# Patient Record
Sex: MALE | Race: WHITE | NOT HISPANIC OR LATINO | Employment: FULL TIME | URBAN - METROPOLITAN AREA
[De-identification: names, ages, dates, MRNs, and addresses within clinical notes are randomized per-mention and may not be internally consistent; named-entity substitution may affect disease eponyms.]

---

## 2022-09-04 ENCOUNTER — HOSPITAL ENCOUNTER (OUTPATIENT)
Facility: MEDICAL CENTER | Age: 57
End: 2022-09-05
Attending: EMERGENCY MEDICINE | Admitting: HOSPITALIST
Payer: COMMERCIAL

## 2022-09-04 ENCOUNTER — APPOINTMENT (OUTPATIENT)
Dept: RADIOLOGY | Facility: MEDICAL CENTER | Age: 57
End: 2022-09-04
Attending: EMERGENCY MEDICINE
Payer: COMMERCIAL

## 2022-09-04 DIAGNOSIS — I48.0 PAROXYSMAL ATRIAL FIBRILLATION (HCC): ICD-10-CM

## 2022-09-04 PROBLEM — R07.9 CHEST PAIN: Status: ACTIVE | Noted: 2022-09-04

## 2022-09-04 PROBLEM — I10 ESSENTIAL HYPERTENSION: Status: ACTIVE | Noted: 2022-09-04

## 2022-09-04 PROBLEM — R79.89 ELEVATED D-DIMER: Status: ACTIVE | Noted: 2022-09-04

## 2022-09-04 PROBLEM — I25.10 CAD (CORONARY ARTERY DISEASE): Status: ACTIVE | Noted: 2022-09-04

## 2022-09-04 PROBLEM — N18.30 STAGE 3 CHRONIC KIDNEY DISEASE: Status: ACTIVE | Noted: 2022-09-04

## 2022-09-04 PROBLEM — I13.0 BENIGN HYPERTENSIVE HEART AND KIDNEY DISEASE WITH DIASTOLIC CHF, NYHA CLASS II AND CKD STAGE III (HCC): Status: ACTIVE | Noted: 2022-09-04

## 2022-09-04 PROBLEM — I50.30 BENIGN HYPERTENSIVE HEART AND KIDNEY DISEASE WITH DIASTOLIC CHF, NYHA CLASS II AND CKD STAGE III (HCC): Status: ACTIVE | Noted: 2022-09-04

## 2022-09-04 PROBLEM — E11.65 TYPE 2 DIABETES MELLITUS WITH HYPERGLYCEMIA, WITHOUT LONG-TERM CURRENT USE OF INSULIN (HCC): Status: ACTIVE | Noted: 2022-09-04

## 2022-09-04 PROBLEM — N18.30 BENIGN HYPERTENSIVE HEART AND KIDNEY DISEASE WITH DIASTOLIC CHF, NYHA CLASS II AND CKD STAGE III (HCC): Status: ACTIVE | Noted: 2022-09-04

## 2022-09-04 LAB
ALBUMIN SERPL BCP-MCNC: 3.4 G/DL (ref 3.2–4.9)
ALBUMIN/GLOB SERPL: 1.6 G/DL
ALP SERPL-CCNC: 56 U/L (ref 30–99)
ALT SERPL-CCNC: 44 U/L (ref 2–50)
ANION GAP SERPL CALC-SCNC: 7 MMOL/L (ref 7–16)
APTT PPP: 30.1 SEC (ref 24.7–36)
AST SERPL-CCNC: 30 U/L (ref 12–45)
BASOPHILS # BLD AUTO: 0.4 % (ref 0–1.8)
BASOPHILS # BLD: 0.03 K/UL (ref 0–0.12)
BILIRUB SERPL-MCNC: 0.5 MG/DL (ref 0.1–1.5)
BUN SERPL-MCNC: 31 MG/DL (ref 8–22)
CALCIUM SERPL-MCNC: 8.1 MG/DL (ref 8.5–10.5)
CHLORIDE SERPL-SCNC: 109 MMOL/L (ref 96–112)
CO2 SERPL-SCNC: 25 MMOL/L (ref 20–33)
CREAT SERPL-MCNC: 2 MG/DL (ref 0.5–1.4)
D DIMER PPP IA.FEU-MCNC: 4.02 UG/ML (FEU) (ref 0–0.5)
EKG IMPRESSION: NORMAL
EOSINOPHIL # BLD AUTO: 0.19 K/UL (ref 0–0.51)
EOSINOPHIL NFR BLD: 2.7 % (ref 0–6.9)
ERYTHROCYTE [DISTWIDTH] IN BLOOD BY AUTOMATED COUNT: 40 FL (ref 35.9–50)
GFR SERPLBLD CREATININE-BSD FMLA CKD-EPI: 38 ML/MIN/1.73 M 2
GLOBULIN SER CALC-MCNC: 2.1 G/DL (ref 1.9–3.5)
GLUCOSE BLD STRIP.AUTO-MCNC: 223 MG/DL (ref 65–99)
GLUCOSE SERPL-MCNC: 122 MG/DL (ref 65–99)
HCT VFR BLD AUTO: 35.5 % (ref 42–52)
HGB BLD-MCNC: 11.8 G/DL (ref 14–18)
IMM GRANULOCYTES # BLD AUTO: 0.02 K/UL (ref 0–0.11)
IMM GRANULOCYTES NFR BLD AUTO: 0.3 % (ref 0–0.9)
INR PPP: 1.1 (ref 0.87–1.13)
LIPASE SERPL-CCNC: 46 U/L (ref 11–82)
LYMPHOCYTES # BLD AUTO: 1.93 K/UL (ref 1–4.8)
LYMPHOCYTES NFR BLD: 27.1 % (ref 22–41)
MCH RBC QN AUTO: 28.4 PG (ref 27–33)
MCHC RBC AUTO-ENTMCNC: 33.2 G/DL (ref 33.7–35.3)
MCV RBC AUTO: 85.3 FL (ref 81.4–97.8)
MONOCYTES # BLD AUTO: 0.85 K/UL (ref 0–0.85)
MONOCYTES NFR BLD AUTO: 12 % (ref 0–13.4)
NEUTROPHILS # BLD AUTO: 4.09 K/UL (ref 1.82–7.42)
NEUTROPHILS NFR BLD: 57.5 % (ref 44–72)
NRBC # BLD AUTO: 0 K/UL
NRBC BLD-RTO: 0 /100 WBC
PLATELET # BLD AUTO: 206 K/UL (ref 164–446)
PMV BLD AUTO: 10.6 FL (ref 9–12.9)
POTASSIUM SERPL-SCNC: 3.9 MMOL/L (ref 3.6–5.5)
PROT SERPL-MCNC: 5.5 G/DL (ref 6–8.2)
PROTHROMBIN TIME: 14.1 SEC (ref 12–14.6)
RBC # BLD AUTO: 4.16 M/UL (ref 4.7–6.1)
SODIUM SERPL-SCNC: 141 MMOL/L (ref 135–145)
TROPONIN T SERPL-MCNC: 15 NG/L (ref 6–19)
TROPONIN T SERPL-MCNC: 16 NG/L (ref 6–19)
UFH PPP CHRO-ACNC: <0.1 IU/ML
WBC # BLD AUTO: 7.1 K/UL (ref 4.8–10.8)

## 2022-09-04 PROCEDURE — 85520 HEPARIN ASSAY: CPT

## 2022-09-04 PROCEDURE — 84484 ASSAY OF TROPONIN QUANT: CPT

## 2022-09-04 PROCEDURE — 80053 COMPREHEN METABOLIC PANEL: CPT

## 2022-09-04 PROCEDURE — 99285 EMERGENCY DEPT VISIT HI MDM: CPT

## 2022-09-04 PROCEDURE — A9270 NON-COVERED ITEM OR SERVICE: HCPCS | Performed by: HOSPITALIST

## 2022-09-04 PROCEDURE — 93005 ELECTROCARDIOGRAM TRACING: CPT | Performed by: EMERGENCY MEDICINE

## 2022-09-04 PROCEDURE — 700102 HCHG RX REV CODE 250 W/ 637 OVERRIDE(OP): Performed by: HOSPITALIST

## 2022-09-04 PROCEDURE — 99220 PR INITIAL OBSERVATION CARE,LEVL III: CPT | Performed by: HOSPITALIST

## 2022-09-04 PROCEDURE — 85730 THROMBOPLASTIN TIME PARTIAL: CPT

## 2022-09-04 PROCEDURE — 85379 FIBRIN DEGRADATION QUANT: CPT

## 2022-09-04 PROCEDURE — 85025 COMPLETE CBC W/AUTO DIFF WBC: CPT

## 2022-09-04 PROCEDURE — 36415 COLL VENOUS BLD VENIPUNCTURE: CPT

## 2022-09-04 PROCEDURE — 93005 ELECTROCARDIOGRAM TRACING: CPT | Performed by: HOSPITALIST

## 2022-09-04 PROCEDURE — G0378 HOSPITAL OBSERVATION PER HR: HCPCS

## 2022-09-04 PROCEDURE — 85610 PROTHROMBIN TIME: CPT

## 2022-09-04 PROCEDURE — 82962 GLUCOSE BLOOD TEST: CPT

## 2022-09-04 PROCEDURE — 700105 HCHG RX REV CODE 258: Performed by: EMERGENCY MEDICINE

## 2022-09-04 PROCEDURE — 93970 EXTREMITY STUDY: CPT | Mod: 26 | Performed by: INTERNAL MEDICINE

## 2022-09-04 PROCEDURE — 700105 HCHG RX REV CODE 258: Performed by: HOSPITALIST

## 2022-09-04 PROCEDURE — 96372 THER/PROPH/DIAG INJ SC/IM: CPT

## 2022-09-04 PROCEDURE — 93970 EXTREMITY STUDY: CPT

## 2022-09-04 PROCEDURE — 83690 ASSAY OF LIPASE: CPT

## 2022-09-04 PROCEDURE — 700111 HCHG RX REV CODE 636 W/ 250 OVERRIDE (IP): Performed by: HOSPITALIST

## 2022-09-04 PROCEDURE — 71045 X-RAY EXAM CHEST 1 VIEW: CPT

## 2022-09-04 RX ORDER — ASPIRIN 325 MG
325 TABLET ORAL DAILY
Status: DISCONTINUED | OUTPATIENT
Start: 2022-09-04 | End: 2022-09-05

## 2022-09-04 RX ORDER — ATORVASTATIN CALCIUM 80 MG/1
80 TABLET, FILM COATED ORAL NIGHTLY
COMMUNITY

## 2022-09-04 RX ORDER — POLYETHYLENE GLYCOL 3350 17 G/17G
1 POWDER, FOR SOLUTION ORAL
Status: DISCONTINUED | OUTPATIENT
Start: 2022-09-04 | End: 2022-09-05 | Stop reason: HOSPADM

## 2022-09-04 RX ORDER — PROMETHAZINE HYDROCHLORIDE 25 MG/1
12.5-25 TABLET ORAL EVERY 4 HOURS PRN
Status: DISCONTINUED | OUTPATIENT
Start: 2022-09-04 | End: 2022-09-05 | Stop reason: HOSPADM

## 2022-09-04 RX ORDER — GAUZE BANDAGE 2" X 2"
100 BANDAGE TOPICAL DAILY
COMMUNITY

## 2022-09-04 RX ORDER — ACETAMINOPHEN 325 MG/1
650 TABLET ORAL EVERY 6 HOURS PRN
Status: DISCONTINUED | OUTPATIENT
Start: 2022-09-04 | End: 2022-09-05 | Stop reason: HOSPADM

## 2022-09-04 RX ORDER — AMOXICILLIN 250 MG
2 CAPSULE ORAL 2 TIMES DAILY
Status: DISCONTINUED | OUTPATIENT
Start: 2022-09-05 | End: 2022-09-05 | Stop reason: HOSPADM

## 2022-09-04 RX ORDER — ONDANSETRON 2 MG/ML
4 INJECTION INTRAMUSCULAR; INTRAVENOUS EVERY 4 HOURS PRN
Status: DISCONTINUED | OUTPATIENT
Start: 2022-09-04 | End: 2022-09-05 | Stop reason: HOSPADM

## 2022-09-04 RX ORDER — ONDANSETRON 4 MG/1
4 TABLET, ORALLY DISINTEGRATING ORAL EVERY 4 HOURS PRN
Status: DISCONTINUED | OUTPATIENT
Start: 2022-09-04 | End: 2022-09-05 | Stop reason: HOSPADM

## 2022-09-04 RX ORDER — LABETALOL HYDROCHLORIDE 5 MG/ML
10 INJECTION, SOLUTION INTRAVENOUS EVERY 4 HOURS PRN
Status: DISCONTINUED | OUTPATIENT
Start: 2022-09-04 | End: 2022-09-05 | Stop reason: HOSPADM

## 2022-09-04 RX ORDER — SODIUM CHLORIDE 9 MG/ML
INJECTION, SOLUTION INTRAVENOUS CONTINUOUS
Status: DISCONTINUED | OUTPATIENT
Start: 2022-09-04 | End: 2022-09-05 | Stop reason: HOSPADM

## 2022-09-04 RX ORDER — GABAPENTIN 300 MG/1
900 CAPSULE ORAL
Status: DISCONTINUED | OUTPATIENT
Start: 2022-09-04 | End: 2022-09-05 | Stop reason: HOSPADM

## 2022-09-04 RX ORDER — GABAPENTIN 300 MG/1
900 CAPSULE ORAL
COMMUNITY

## 2022-09-04 RX ORDER — AMLODIPINE BESYLATE 10 MG/1
10 TABLET ORAL DAILY
Status: DISCONTINUED | OUTPATIENT
Start: 2022-09-05 | End: 2022-09-05 | Stop reason: HOSPADM

## 2022-09-04 RX ORDER — HEPARIN SODIUM 5000 [USP'U]/ML
5000 INJECTION, SOLUTION INTRAVENOUS; SUBCUTANEOUS EVERY 8 HOURS
Status: DISCONTINUED | OUTPATIENT
Start: 2022-09-04 | End: 2022-09-05

## 2022-09-04 RX ORDER — DULOXETIN HYDROCHLORIDE 60 MG/1
60 CAPSULE, DELAYED RELEASE ORAL DAILY
Status: DISCONTINUED | OUTPATIENT
Start: 2022-09-05 | End: 2022-09-05 | Stop reason: HOSPADM

## 2022-09-04 RX ORDER — FOLIC ACID 1 MG/1
1 TABLET ORAL DAILY
Status: DISCONTINUED | OUTPATIENT
Start: 2022-09-05 | End: 2022-09-05 | Stop reason: HOSPADM

## 2022-09-04 RX ORDER — ATORVASTATIN CALCIUM 80 MG/1
80 TABLET, FILM COATED ORAL NIGHTLY
Status: DISCONTINUED | OUTPATIENT
Start: 2022-09-04 | End: 2022-09-05 | Stop reason: HOSPADM

## 2022-09-04 RX ORDER — GAUZE BANDAGE 2" X 2"
100 BANDAGE TOPICAL DAILY
Status: DISCONTINUED | OUTPATIENT
Start: 2022-09-05 | End: 2022-09-05 | Stop reason: HOSPADM

## 2022-09-04 RX ORDER — SODIUM CHLORIDE 9 MG/ML
1000 INJECTION, SOLUTION INTRAVENOUS ONCE
Status: COMPLETED | OUTPATIENT
Start: 2022-09-04 | End: 2022-09-04

## 2022-09-04 RX ORDER — FOLIC ACID 1 MG/1
1 TABLET ORAL DAILY
COMMUNITY

## 2022-09-04 RX ORDER — CARVEDILOL 25 MG/1
25 TABLET ORAL 2 TIMES DAILY
COMMUNITY

## 2022-09-04 RX ORDER — PROMETHAZINE HYDROCHLORIDE 25 MG/1
12.5-25 SUPPOSITORY RECTAL EVERY 4 HOURS PRN
Status: DISCONTINUED | OUTPATIENT
Start: 2022-09-04 | End: 2022-09-05 | Stop reason: HOSPADM

## 2022-09-04 RX ORDER — DULOXETIN HYDROCHLORIDE 60 MG/1
60 CAPSULE, DELAYED RELEASE ORAL DAILY
COMMUNITY

## 2022-09-04 RX ORDER — ASPIRIN 81 MG/1
324 TABLET, CHEWABLE ORAL DAILY
Status: DISCONTINUED | OUTPATIENT
Start: 2022-09-04 | End: 2022-09-05

## 2022-09-04 RX ORDER — CARVEDILOL 12.5 MG/1
25 TABLET ORAL 2 TIMES DAILY
Status: DISCONTINUED | OUTPATIENT
Start: 2022-09-04 | End: 2022-09-05 | Stop reason: HOSPADM

## 2022-09-04 RX ORDER — AMLODIPINE BESYLATE 10 MG/1
10 TABLET ORAL DAILY
COMMUNITY

## 2022-09-04 RX ORDER — PROCHLORPERAZINE EDISYLATE 5 MG/ML
5-10 INJECTION INTRAMUSCULAR; INTRAVENOUS EVERY 4 HOURS PRN
Status: DISCONTINUED | OUTPATIENT
Start: 2022-09-04 | End: 2022-09-05 | Stop reason: HOSPADM

## 2022-09-04 RX ORDER — ASPIRIN 300 MG/1
300 SUPPOSITORY RECTAL DAILY
Status: DISCONTINUED | OUTPATIENT
Start: 2022-09-04 | End: 2022-09-05

## 2022-09-04 RX ORDER — BISACODYL 10 MG
10 SUPPOSITORY, RECTAL RECTAL
Status: DISCONTINUED | OUTPATIENT
Start: 2022-09-04 | End: 2022-09-05 | Stop reason: HOSPADM

## 2022-09-04 RX ADMIN — GABAPENTIN 900 MG: 300 CAPSULE ORAL at 21:44

## 2022-09-04 RX ADMIN — INSULIN HUMAN 3 UNITS: 100 INJECTION, SOLUTION PARENTERAL at 21:42

## 2022-09-04 RX ADMIN — ASPIRIN 325 MG: 325 TABLET ORAL at 18:36

## 2022-09-04 RX ADMIN — HEPARIN SODIUM 5000 UNITS: 5000 INJECTION, SOLUTION INTRAVENOUS; SUBCUTANEOUS at 21:43

## 2022-09-04 RX ADMIN — CARVEDILOL 25 MG: 12.5 TABLET, FILM COATED ORAL at 18:36

## 2022-09-04 RX ADMIN — ATORVASTATIN CALCIUM 80 MG: 80 TABLET, FILM COATED ORAL at 21:44

## 2022-09-04 RX ADMIN — SODIUM CHLORIDE: 9 INJECTION, SOLUTION INTRAVENOUS at 18:37

## 2022-09-04 RX ADMIN — SODIUM CHLORIDE 1000 ML: 9 INJECTION, SOLUTION INTRAVENOUS at 16:15

## 2022-09-04 ASSESSMENT — LIFESTYLE VARIABLES
EVER HAD A DRINK FIRST THING IN THE MORNING TO STEADY YOUR NERVES TO GET RID OF A HANGOVER: NO
EVER FELT BAD OR GUILTY ABOUT YOUR DRINKING: NO
DO YOU DRINK ALCOHOL: NO
CONSUMPTION TOTAL: INCOMPLETE
HAVE PEOPLE ANNOYED YOU BY CRITICIZING YOUR DRINKING: NO
TOTAL SCORE: 0
HAVE YOU EVER FELT YOU SHOULD CUT DOWN ON YOUR DRINKING: NO
TOTAL SCORE: 0
TOTAL SCORE: 0

## 2022-09-04 ASSESSMENT — FIBROSIS 4 INDEX: FIB4 SCORE: 1.27

## 2022-09-04 ASSESSMENT — ENCOUNTER SYMPTOMS: NAUSEA: 1

## 2022-09-04 NOTE — ED TRIAGE NOTES
Chief Complaint   Patient presents with    Headache     Pt presents from [facility] man for headache, CP and nausea that started at 0500am. Pt reports he forgot his nitro rx at home so was unable to take it when he started having CP. Pt given aspirin and nitro per EMS. Pt reports all symptoms have resolved upon arrival to ER.

## 2022-09-04 NOTE — ED PROVIDER NOTES
ED Provider Note    CHIEF COMPLAINT  Chief Complaint   Patient presents with    Headache     Pt presents from [facility] for headache, CP and nausea that started at 0500am. Pt reports he forgot his nitro rx at home so was unable to take it when he started having CP. Pt given aspirin and nitro per EMS. Pt reports all symptoms have resolved upon arrival to ER.       HPI  José Zabala is a 57 y.o. male who presents for evaluation of numerous symptoms including an episode of chest pain that started this morning.  He does have a parent history of prior heart attack.  He was out a burning man for the last several days.  He denies leg swelling or hemoptysis.  No pleuritic chest pain.  He did report some mild headache.  He denies any head injury or focal numbness weakness tingling to the arms legs or face.  No reported speech abnormalities.  No report of high fever or productive cough.  No leg pain or swelling.  He was seen at the [facility] and [facility] and had an EKG demonstrating no obvious ST segment elevation.  He was given nitroglycerin and aspirin.  He currently has no chest pain.  Patient apparently has history of kidney insufficiency and reports that he had a mild heart attack almost 10 years ago.  Patient is a marginal historian but he reports he never had a heart catheterization or bypass surgery.  He also reports that he had a nuclear medicine stress test last January which was normal.  He lives in [place].  The patient also has history of previous DVTs and is not currently on anticoagulant therapy    REVIEW OF SYSTEMS  See [facility] for further details.  No high fevers pleuritic chest pain numbness weakness tingling all other systems are negative.     PAST MEDICAL HISTORY  No past medical history on file.  Renal insufficiency apparent history of heart attack  History of DVT  FAMILY HISTORY  Noncontributory    SOCIAL HISTORY  Lives in [place]    SURGICAL HISTORY  No past surgical  "history on file.  No major surgeries  CURRENT MEDICATIONS  Home Medications    **Home medications have not yet been reviewed for this encounter**     Nitroglycerin, aspirin    ALLERGIES  No Known Allergies    PHYSICAL EXAM  VITAL SIGNS: BP (!) 162/90   Pulse (!) 59   Temp 37 °C (98.6 °F)   Resp 12   Ht 1.651 m (5' 5\")   Wt 93.9 kg (207 lb)   SpO2 91%   BMI 34.45 kg/m²       Constitutional: Well developed, Well nourished, No acute distress, Non-toxic appearance.   HENT: Normocephalic, Atraumatic, Bilateral external ears normal, Oropharynx moist, No oral exudates, Nose normal.   Eyes: PERRLA, EOMI, Conjunctiva normal, No discharge.   Neck: Normal range of motion, No tenderness, Supple, No stridor.   Cardiovascular: Normal heart rate, Normal rhythm, No murmurs, No rubs, No gallops.   Thorax & Lungs: Normal breath sounds, No respiratory distress, No wheezing, No chest tenderness.   Abdomen: Bowel sounds normal, Soft, No tenderness, No masses, No pulsatile masses.   Skin: Warm, Dry, No erythema, No rash.   Back: No tenderness, No CVA tenderness.   Extremities: Intact distal pulses, No edema, No tenderness, No cyanosis, No clubbing.   Neurologic: Alert & oriented x 3, Normal motor function, Normal sensory function, No focal deficits noted.   Psychiatric: Affect normal, Judgment normal, Mood normal.     Results for orders placed or performed during the hospital encounter of 09/04/22   CBC WITH DIFFERENTIAL   Result Value Ref Range    WBC 7.1 4.8 - 10.8 K/uL    RBC 4.16 (L) 4.70 - 6.10 M/uL    Hemoglobin 11.8 (L) 14.0 - 18.0 g/dL    Hematocrit 35.5 (L) 42.0 - 52.0 %    MCV 85.3 81.4 - 97.8 fL    MCH 28.4 27.0 - 33.0 pg    MCHC 33.2 (L) 33.7 - 35.3 g/dL    RDW 40.0 35.9 - 50.0 fL    Platelet Count 206 164 - 446 K/uL    MPV 10.6 9.0 - 12.9 fL    Neutrophils-Polys 57.50 44.00 - 72.00 %    Lymphocytes 27.10 22.00 - 41.00 %    Monocytes 12.00 0.00 - 13.40 %    Eosinophils 2.70 0.00 - 6.90 %    Basophils 0.40 0.00 - 1.80 % "    Immature Granulocytes 0.30 0.00 - 0.90 %    Nucleated RBC 0.00 /100 WBC    Neutrophils (Absolute) 4.09 1.82 - 7.42 K/uL    Lymphs (Absolute) 1.93 1.00 - 4.80 K/uL    Monos (Absolute) 0.85 0.00 - 0.85 K/uL    Eos (Absolute) 0.19 0.00 - 0.51 K/uL    Baso (Absolute) 0.03 0.00 - 0.12 K/uL    Immature Granulocytes (abs) 0.02 0.00 - 0.11 K/uL    NRBC (Absolute) 0.00 K/uL   Comp Metabolic Panel   Result Value Ref Range    Sodium 141 135 - 145 mmol/L    Potassium 3.9 3.6 - 5.5 mmol/L    Chloride 109 96 - 112 mmol/L    Co2 25 20 - 33 mmol/L    Anion Gap 7.0 7.0 - 16.0    Glucose 122 (H) 65 - 99 mg/dL    Bun 31 (H) 8 - 22 mg/dL    Creatinine 2.00 (H) 0.50 - 1.40 mg/dL    Calcium 8.1 (L) 8.5 - 10.5 mg/dL    AST(SGOT) 30 12 - 45 U/L    ALT(SGPT) 44 2 - 50 U/L    Alkaline Phosphatase 56 30 - 99 U/L    Total Bilirubin 0.5 0.1 - 1.5 mg/dL    Albumin 3.4 3.2 - 4.9 g/dL    Total Protein 5.5 (L) 6.0 - 8.2 g/dL    Globulin 2.1 1.9 - 3.5 g/dL    A-G Ratio 1.6 g/dL   LIPASE   Result Value Ref Range    Lipase 46 11 - 82 U/L   TROPONIN   Result Value Ref Range    Troponin T 16 6 - 19 ng/L   ESTIMATED GFR   Result Value Ref Range    GFR (CKD-EPI) 38 (A) >60 mL/min/1.73 m 2   D-DIMER   Result Value Ref Range    D-Dimer Screen 4.02 (H) 0.00 - 0.50 ug/mL (FEU)   EKG   Result Value Ref Range    Report       [facility]    Test Date:  2022  Pt Name:    CHARLY REZA                 Department: ER  MRN:        [id number]                      Room:       RD 03  Gender:     Male                         Technician: [id number]  :        1965                   Requested By:MONIKA ROSE  Order #:    [id number]                    Reading MD:    Measurements  Intervals                                Axis  Rate:       59                           P:  DE:                                      QRS:        32  QRSD:       96                           T:          53  QT:         418  QTc:        414    Interpretive  Statements  Atrial fibrillation  Borderline low voltage, extremity leads  Baseline wander in lead(s) III,aVF  No previous ECG available for comparison           RADIOLOGY/PROCEDURES  DX-CHEST-PORTABLE (1 VIEW)   Final Result      No acute cardiopulmonary abnormality.         US-EXTREMITY VENOUS LOWER BILAT    (Results Pending)         COURSE & MEDICAL DECISION MAKING  Pertinent Labs & Imaging studies reviewed. (See chart for details)  Patient presented here with some chest discomfort and some shortness of breath.  His chest x-ray and lungs are clear but he has borderline hypoxia at 88 to 90%.  I was concerned about possible acute coronary syndrome as well as pulmonary embolism.  Subsequent D-dimer was profoundly elevated but his chronic kidney disease with a creatinine of 2.0 precludes CT angiogram of the chest to definitively assess for pulmonary embolism.  His EKG does not clearly suggest STEMI and his troponin is not elevated.  At this point we will start heparin protocol and get bilateral leg ultrasounds and admit him to internal medicine for further work-up.  A nuclear medicine VQ scan can be performed tomorrow or later today.  The patient will be admitted to internal medicine.    FINAL IMPRESSION  1. Chest pain  2. Hypoxia  3.  Elevated D-dimer      Electronically signed by: Austin Colin M.D., 9/4/2022 3:52 PM

## 2022-09-05 ENCOUNTER — APPOINTMENT (OUTPATIENT)
Dept: CARDIOLOGY | Facility: MEDICAL CENTER | Age: 57
End: 2022-09-05
Attending: HOSPITALIST
Payer: COMMERCIAL

## 2022-09-05 ENCOUNTER — PHARMACY VISIT (OUTPATIENT)
Dept: PHARMACY | Facility: MEDICAL CENTER | Age: 57
End: 2022-09-05
Payer: COMMERCIAL

## 2022-09-05 ENCOUNTER — APPOINTMENT (OUTPATIENT)
Dept: RADIOLOGY | Facility: MEDICAL CENTER | Age: 57
End: 2022-09-05
Attending: HOSPITALIST
Payer: COMMERCIAL

## 2022-09-05 VITALS
WEIGHT: 227.07 LBS | HEIGHT: 65 IN | HEART RATE: 58 BPM | SYSTOLIC BLOOD PRESSURE: 155 MMHG | OXYGEN SATURATION: 95 % | RESPIRATION RATE: 18 BRPM | BODY MASS INDEX: 37.83 KG/M2 | DIASTOLIC BLOOD PRESSURE: 81 MMHG | TEMPERATURE: 98.7 F

## 2022-09-05 PROBLEM — R07.9 CHEST PAIN: Status: RESOLVED | Noted: 2022-09-04 | Resolved: 2022-09-05

## 2022-09-05 PROBLEM — R79.89 ELEVATED D-DIMER: Status: RESOLVED | Noted: 2022-09-04 | Resolved: 2022-09-05

## 2022-09-05 LAB
ANION GAP SERPL CALC-SCNC: 7 MMOL/L (ref 7–16)
BUN SERPL-MCNC: 29 MG/DL (ref 8–22)
CALCIUM SERPL-MCNC: 8.2 MG/DL (ref 8.5–10.5)
CHLORIDE SERPL-SCNC: 109 MMOL/L (ref 96–112)
CO2 SERPL-SCNC: 26 MMOL/L (ref 20–33)
CREAT SERPL-MCNC: 1.69 MG/DL (ref 0.5–1.4)
EKG IMPRESSION: NORMAL
EKG IMPRESSION: NORMAL
ERYTHROCYTE [DISTWIDTH] IN BLOOD BY AUTOMATED COUNT: 41.1 FL (ref 35.9–50)
GFR SERPLBLD CREATININE-BSD FMLA CKD-EPI: 46 ML/MIN/1.73 M 2
GLUCOSE BLD STRIP.AUTO-MCNC: 107 MG/DL (ref 65–99)
GLUCOSE BLD STRIP.AUTO-MCNC: 172 MG/DL (ref 65–99)
GLUCOSE SERPL-MCNC: 95 MG/DL (ref 65–99)
HCT VFR BLD AUTO: 35.8 % (ref 42–52)
HGB BLD-MCNC: 11.8 G/DL (ref 14–18)
LV EJECT FRACT  99904: 65
LV EJECT FRACT MOD 2C 99903: 65.49
LV EJECT FRACT MOD 4C 99902: 69.35
LV EJECT FRACT MOD BP 99901: 67.38
MCH RBC QN AUTO: 28.5 PG (ref 27–33)
MCHC RBC AUTO-ENTMCNC: 33 G/DL (ref 33.7–35.3)
MCV RBC AUTO: 86.5 FL (ref 81.4–97.8)
PLATELET # BLD AUTO: 200 K/UL (ref 164–446)
PMV BLD AUTO: 11.1 FL (ref 9–12.9)
POTASSIUM SERPL-SCNC: 3.6 MMOL/L (ref 3.6–5.5)
RBC # BLD AUTO: 4.14 M/UL (ref 4.7–6.1)
SODIUM SERPL-SCNC: 142 MMOL/L (ref 135–145)
WBC # BLD AUTO: 7.3 K/UL (ref 4.8–10.8)

## 2022-09-05 PROCEDURE — 82962 GLUCOSE BLOOD TEST: CPT

## 2022-09-05 PROCEDURE — RXMED WILLOW AMBULATORY MEDICATION CHARGE: Performed by: NURSE PRACTITIONER

## 2022-09-05 PROCEDURE — 700105 HCHG RX REV CODE 258: Performed by: HOSPITALIST

## 2022-09-05 PROCEDURE — 78582 LUNG VENTILAT&PERFUS IMAGING: CPT

## 2022-09-05 PROCEDURE — 85027 COMPLETE CBC AUTOMATED: CPT

## 2022-09-05 PROCEDURE — 93306 TTE W/DOPPLER COMPLETE: CPT | Mod: 26 | Performed by: INTERNAL MEDICINE

## 2022-09-05 PROCEDURE — 700111 HCHG RX REV CODE 636 W/ 250 OVERRIDE (IP): Performed by: HOSPITALIST

## 2022-09-05 PROCEDURE — 93010 ELECTROCARDIOGRAM REPORT: CPT | Performed by: INTERNAL MEDICINE

## 2022-09-05 PROCEDURE — 93005 ELECTROCARDIOGRAM TRACING: CPT | Performed by: NURSE PRACTITIONER

## 2022-09-05 PROCEDURE — 80048 BASIC METABOLIC PNL TOTAL CA: CPT

## 2022-09-05 PROCEDURE — 700102 HCHG RX REV CODE 250 W/ 637 OVERRIDE(OP): Performed by: HOSPITALIST

## 2022-09-05 PROCEDURE — 96372 THER/PROPH/DIAG INJ SC/IM: CPT | Mod: XU

## 2022-09-05 PROCEDURE — A9270 NON-COVERED ITEM OR SERVICE: HCPCS | Performed by: HOSPITALIST

## 2022-09-05 PROCEDURE — 93306 TTE W/DOPPLER COMPLETE: CPT

## 2022-09-05 PROCEDURE — 99217 PR OBSERVATION CARE DISCHARGE: CPT | Performed by: NURSE PRACTITIONER

## 2022-09-05 PROCEDURE — G0378 HOSPITAL OBSERVATION PER HR: HCPCS

## 2022-09-05 RX ADMIN — INSULIN HUMAN 2 UNITS: 100 INJECTION, SOLUTION PARENTERAL at 11:46

## 2022-09-05 RX ADMIN — HEPARIN SODIUM 5000 UNITS: 5000 INJECTION, SOLUTION INTRAVENOUS; SUBCUTANEOUS at 06:14

## 2022-09-05 RX ADMIN — FOLIC ACID 1 MG: 1 TABLET ORAL at 06:14

## 2022-09-05 RX ADMIN — DULOXETINE HYDROCHLORIDE 60 MG: 60 CAPSULE, DELAYED RELEASE ORAL at 06:14

## 2022-09-05 RX ADMIN — Medication 100 MG: at 06:14

## 2022-09-05 RX ADMIN — SODIUM CHLORIDE: 9 INJECTION, SOLUTION INTRAVENOUS at 07:36

## 2022-09-05 RX ADMIN — AMLODIPINE BESYLATE 10 MG: 10 TABLET ORAL at 06:14

## 2022-09-05 RX ADMIN — CARVEDILOL 25 MG: 12.5 TABLET, FILM COATED ORAL at 06:14

## 2022-09-05 ASSESSMENT — CHA2DS2 SCORE
CHA2DS2 VASC SCORE: 3
AGE 65 TO 74: NO
CHF OR LEFT VENTRICULAR DYSFUNCTION: NO
AGE 75 OR GREATER: NO
HYPERTENSION: YES
DIABETES: YES
VASCULAR DISEASE: YES
PRIOR STROKE OR TIA OR THROMBOEMBOLISM: NO
SEX: MALE

## 2022-09-05 ASSESSMENT — PAIN DESCRIPTION - PAIN TYPE: TYPE: ACUTE PAIN

## 2022-09-05 NOTE — H&P
Total knee arthroplasty [facility] Medicine History & Physical Note    Date of Service  9/4/2022    Primary Care Physician  Pcp Not In Computer    Consultants    Code Status  Full Code    Chief Complaint  Chief Complaint   Patient presents with    Headache     Pt presents from [facility] for headache, CP and nausea that started at 0500am. Pt reports he forgot his nitro rx at home so was unable to take it when he started having CP. Pt given aspirin and nitro per EMS. Pt reports all symptoms have resolved upon arrival to ER.       History of Presenting Illness  Jaydon Zabala is a 57 y.o. male who presented 9/4/2022 with chest pain.  Patient is attending a [employer] event from [place] he has a history of CAD with remote history of MI reports that he had an angiogram at that time with normal coronary arteries and told that his MI was related to microvascular disease.  He has had stress test on a yearly basis and his last stress test in January of this year was negative per pt.  Patient reports that this morning he had nausea and vomiting and had an episode of chest pain that he describes as a dull  Pain in his chest lasted for a few minutes did not radiate is associated with shortness of breath and headache.  He was given aspirin and nitroglycerin with resolution of his symptoms and subsequently transferred to our facility.  He reports having history of DVT after left knee surgery in 2020  he has a history of type 2 diabetes which is currently diet controlled..  He has a history of hypertension which he reports has been controlled on carvedilol and amlodipine as well as chronic kidney disease serum creatinine about 1.7-1.8.  At the time of my examination he denies any chest pain or dyspnea.  He had mild hypoxia with oxygen saturation of 88% on room air    I discussed the plan of care with patient, family, and pharmacy.    Review of Systems  Review of Systems   Constitutional:  Positive for malaise/fatigue.    Gastrointestinal:  Positive for nausea.   All other systems reviewed and are negative.    Past Medical History  History of MI diabetes type 2 diet controlled hypertension    Surgical History  Total knee arthroplasty    Family History  Positive for cardiac disease  Family history reviewed with patient. There is family history that is pertinent to the chief complaint.     Social History  He does not smoke no alcohol illicit drug abuse    Allergies  No Known Allergies    Medications  None   Amlodipine carvedilol Cymbalta atorvastatin gabapentin    Physical Exam  Temp:  [37 °C (98.6 °F)] 37 °C (98.6 °F)  Pulse:  [58-62] 59  Resp:  [12-16] 12  BP: (162)/(83-90) 162/90  SpO2:  [88 %-94 %] 91 %  Blood Pressure: (!) 162/90   Temperature: 37 °C (98.6 °F)   Pulse: (!) 59   Respiration: 12   Pulse Oximetry: 91 %       Physical Exam  Vitals and nursing note reviewed.   Constitutional:       Appearance: He is well-developed. He is obese. He is not diaphoretic.   HENT:      Head: Normocephalic and atraumatic.      Mouth/Throat:      Pharynx: No oropharyngeal exudate.   Eyes:      General: No scleral icterus.        Right eye: No discharge.         Left eye: No discharge.      Conjunctiva/sclera: Conjunctivae normal.      Pupils: Pupils are equal, round, and reactive to light.   Neck:      Vascular: No JVD.      Trachea: No tracheal deviation.   Cardiovascular:      Rate and Rhythm: Normal rate and regular rhythm.      Heart sounds: No murmur heard.    No friction rub. No gallop.   Pulmonary:      Effort: Pulmonary effort is normal. No respiratory distress.      Breath sounds: No stridor. Decreased breath sounds present. No wheezing.   Chest:      Chest wall: No tenderness.   Abdominal:      General: Bowel sounds are normal. There is no distension.      Palpations: Abdomen is soft.      Tenderness: There is no abdominal tenderness. There is no rebound.   Musculoskeletal:         General: Swelling present. No tenderness.       Cervical back: Neck supple.   Skin:     General: Skin is warm and dry.      Nails: There is no clubbing.   Neurological:      Mental Status: He is alert and oriented to person, place, and time.      Cranial Nerves: No cranial nerve deficit.      Motor: No abnormal muscle tone.   Psychiatric:         Behavior: Behavior normal.       Laboratory:  Recent Labs     09/04/22  1510   WBC 7.1   RBC 4.16*   HEMOGLOBIN 11.8*   HEMATOCRIT 35.5*   MCV 85.3   MCH 28.4   MCHC 33.2*   RDW 40.0   PLATELETCT 206   MPV 10.6     Recent Labs     09/04/22  1510   SODIUM 141   POTASSIUM 3.9   CHLORIDE 109   CO2 25   GLUCOSE 122*   BUN 31*   CREATININE 2.00*   CALCIUM 8.1*     Recent Labs     09/04/22  1510   ALTSGPT 44   ASTSGOT 30   ALKPHOSPHAT 56   TBILIRUBIN 0.5   LIPASE 46   GLUCOSE 122*         No results for input(s): NTPROBNP in the last 72 hours.      Recent Labs     09/04/22  1510   TROPONINT 16       Imaging:  DX-CHEST-PORTABLE (1 VIEW)   Final Result      No acute cardiopulmonary abnormality.         US-EXTREMITY VENOUS LOWER BILAT    (Results Pending)   EC-ECHOCARDIOGRAM COMPLETE W/O CONT    (Results Pending)           Assessment/Plan:  Justification for Admission Status  I anticipate this patient is appropriate for observation status at this time because work-up and evaluation of chest pain    Patient will need a Telemetry bed on CARDIOLOGY service .  The need is secondary to monitoring for arrhythmias.    * Chest pain- (present on admission)  Assessment & Plan  EKG with no acute ischemic changes  Initial troponin normal  Patient will be admitted and monitored on telemetry  We will trend his troponin  He reports that he had a negative stress test 8 months ago we will try to obtain records  Hypoxia and elevated D-dimer we will check VQ scan  Chest x-ray reviewed negative for acute pathology        Essential hypertension  Assessment & Plan  Continue home meds and monitor blood pressure    Stage 3 chronic kidney disease  ([facility])  Assessment & Plan  Patient is clinically dehydrated IV fluids and monitor renal function electrolytes      CAD (coronary artery disease)  Assessment & Plan  Continue medical therapy with carvedilol aspirin atorvastatin    Type 2 diabetes mellitus with hyperglycemia, without long-term current use of insulin ([facility])  Assessment & Plan  Diet controlled  Insulin sliding scale monitor CBGs    Elevated d-dimer  Assessment & Plan  If lower extremity duplex is negative we will proceed with VQ scan  Will not empirically anticoagulate at this time pending VQ scan results      VTE prophylaxis: heparin ppx

## 2022-09-05 NOTE — PROGRESS NOTES
0705- Report received from NOC RN.    0740- Assessment complete.     0805- Notified by monitors that patient has converted into a fib rate 50-70. Patient is asymptomatic, denies any CP, or feeling of palpitations.     0809- SHREE Caruso notified.     0950- Received a call from [employer] that at 0830 patient converted to a junctional rhythm in the 50's. Patient asymptomatic. SHREE Caruso, updated.    1011- Cardiology read EKG, Sinus Rhythm.

## 2022-09-05 NOTE — DISCHARGE SUMMARY
Discharge Summary    CHIEF COMPLAINT ON ADMISSION  Chief Complaint   Patient presents with    Headache     Pt presents from [facility] for headache, CP and nausea that started at 0500am. Pt reports he forgot his nitro rx at home so was unable to take it when he started having CP. Pt given aspirin and nitro per EMS. Pt reports all symptoms have resolved upon arrival to ER.       Reason for Admission  EMS     Admission Date  9/4/2022    CODE STATUS  Full Code    HPI & HOSPITAL COURSE    Mr. José Zabala Jr is a 57-year-old male with a PMHx of DMT2, HTN, chronic kidney disease with baseline serum creatinine at 1.7-1.8, CAD, MI, DVT s/p knee surgery in 2020, admitted on 9/4/2022 due to complaints of chest pain.    Patient is a resident at [facility] and is here in town attending [facility].  Patient states that he has had significant history of CAD and received an angiogram when he had an MI and was told that he had normal coronary arteries.  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  Due to this, patient gets a yearly stress test with the last one being this January 2022 which was negative per patient.  Patient also reports on the morning admission that his chest pain was associated with nausea and vomiting and had an episode of chest pain that is dull in nature lasting a few minutes.  Patient denies chest pain radiating and is associated with shortness of breath and headache.  Patient at the LT[facility] gave him aspirin and nitroglycerin with resolution of his symptoms and was subsequently transferred to [facility].      In ER, notable lab findings noted RBC at 4.16, hemoglobin 11.8, hematocrit 35.5, glucose 122, BUN 31, creatinine 2.00, GFR 38, calcium 8.1.  Initial troponin T at 16 with a follow-up at 15.  Patient's D-dimer is 4.02.  Follow-up ultrasound bilateral extremity noted no evidence of DVT.  Patient admitted into the observation unit for ACS versus PE  work-up.    During this hospital stay, patient noted to be in atrial fibrillation which is new findings for him.  Patient denies being diagnosed with atrial fibrillation in the past.  An echocardiogram was obtained which noted moderate concentric left ventricular hypertrophy with normal left ventricular size and systolic function with LVEF approximately 65%.  The right ventricle is normal in size and systolic function that has mildly dilated left atrium, aortic valve sclerosis without significant stenosis and normal pericardium without effusion.  A follow-up NM VQ scan due to elevated D-dimer was done which noted normal ventilation scan with no segmental perfusion defects.  Patient was initiated on Xarelto 20 mg nightly for management of atrial fibrillation.  Patient currently on a beta-blocker carvedilol for management of blood pressure.    Patient seen and examined prior to being discharged.  Patient denies any symptoms at this time.  Patient denies any chest pain, no shortness of breath, no palpitations since admission.  Discussed with patient and and VQ scan results along with echocardiogram which were both unremarkable.  Discussed with patient continuation of carvedilol with addition to Xarelto for management of new onset atrial fibrillation.  Patient has recommended follow-up with PCP for management of care.  Patient to obtain referral to follow-up with cardiology for management of atrial fibrillation.  Patient to resume all home medications.  All questions and concerns answered prior to being discharged.  Patient discharged home.    Therefore, he is discharged in good and stable condition to home with close outpatient follow-up.    The patient recovered much more quickly than anticipated on admission.    Discharge Date  09/05/22      FOLLOW UP ITEMS POST DISCHARGE  Please call [phone] to schedule PCP appointment for patient.    Required specialty appointments include:       Discharge Instructions per  SILVINA Ruggiero    -Follow-up with PCP in [facility] s/p hospitalization  -Obtain referral to follow-up with cardiology for management of new onset atrial fibrillation  -Present a copy of EKG obtained in [place] noting atrial fibrillation  -Start taking Xarelto 20 mg nightly for management of atrial fibrillation  -Continue all other home medications specifically carvedilol for management of new onset atrial fib    DIET: Cardiac and diabetic    ACTIVITY: As tolerated    DIAGNOSIS: Chest pain    Return to ER if symptoms persist, chest pain, palpitations, shortness of breath, numbness, tingling, weakness, and high fevers.      DISCHARGE DIAGNOSES  Principal Problem (Resolved):    Chest pain POA: Yes  Active Problems:    Type 2 diabetes mellitus with hyperglycemia, without long-term current use of insulin (HCC) POA: Unknown    CAD (coronary artery disease) POA: Unknown    Stage 3 chronic kidney disease (HCC) POA: Unknown    Essential hypertension POA: Unknown  Resolved Problems:    Elevated d-dimer POA: Unknown      FOLLOW UP  No future appointments.  No follow-up provider specified.    MEDICATIONS ON DISCHARGE     Medication List        START taking these medications        Instructions   rivaroxaban 20 MG Tabs tablet  Commonly known as: XARELTO   Take 1 Tablet by mouth with dinner for 30 days.  Dose: 20 mg            CONTINUE taking these medications        Instructions   amLODIPine 10 MG Tabs  Commonly known as: NORVASC   Take 10 mg by mouth every day.  Dose: 10 mg     aspirin EC 81 MG Tbec  Commonly known as: ECOTRIN   Take 81 mg by mouth every day.  Dose: 81 mg     atorvastatin 80 MG tablet  Commonly known as: LIPITOR   Take 80 mg by mouth every evening.  Dose: 80 mg     carvedilol 25 MG Tabs  Commonly known as: COREG   Take 25 mg by mouth 2 times a day.  Dose: 25 mg     DULoxetine 60 MG Cpep delayed-release capsule  Commonly known as: CYMBALTA   Take 60 mg by mouth every day.  Dose:  60 mg     folic acid 1 MG Tabs  Commonly known as: FOLVITE   Take 1 mg by mouth every day.  Dose: 1 mg     gabapentin 300 MG Caps  Commonly known as: NEURONTIN   Take 900 mg by mouth at bedtime.  Dose: 900 mg     thiamine 100 MG Tabs  Commonly known as: Vitamin B-1   Take 100 mg by mouth every day.  Dose: 100 mg              Allergies  No Known Allergies    DIET  Orders Placed This Encounter   Procedures    Diet Order Diet: Cardiac; Second Modifier: (optional): Consistent CHO (Diabetic)     Standing Status:   Standing     Number of Occurrences:   1     Order Specific Question:   Diet:     Answer:   Cardiac [6]     Order Specific Question:   Second Modifier: (optional)     Answer:   Consistent CHO (Diabetic) [4]       ACTIVITY  As tolerated.  Weight bearing as tolerated    CONSULTATIONS  NONE    PROCEDURES  NONE    IMAGING  EC-ECHOCARDIOGRAM COMPLETE W/O CONT   Final Result      NM-LUNG VENT/PERF IMAGING   Final Result      Normal VQ scan.      US-EXTREMITY VENOUS LOWER BILAT   Final Result      DX-CHEST-PORTABLE (1 VIEW)   Final Result      No acute cardiopulmonary abnormality.               LABORATORY  Lab Results   Component Value Date    SODIUM 142 09/05/2022    POTASSIUM 3.6 09/05/2022    CHLORIDE 109 09/05/2022    CO2 26 09/05/2022    GLUCOSE 95 09/05/2022    BUN 29 (H) 09/05/2022    CREATININE 1.69 (H) 09/05/2022        Lab Results   Component Value Date    WBC 7.3 09/05/2022    HEMOGLOBIN 11.8 (L) 09/05/2022    HEMATOCRIT 35.8 (L) 09/05/2022    PLATELETCT 200 09/05/2022        Total time of the discharge process took 36 minutes.    ========================================================================================================================================================================  Please note that this dictation was created using voice recognition software. I have made every reasonable attempt to correct obvious errors, but there may be errors of grammar and possibly content that I did not  discover before finalizing the note.    Electronically signed by:  GERMAN Archer, MSN, APRN, FNP-C  Hospitalist Services  [facility]  [phone]  [email or web address]  09/05/22                2059

## 2022-09-05 NOTE — PROGRESS NOTES
"Pt family present at time of evaluation. Pt states he feels \"normal\". He has a hx involving cardiac issues and states he has prescribed Nitro for instances like this. However he is denying all issues, including S&S of STEMI, at this time. He states he will call if they arise. No further questions. Pt demonstrated proper use of call light. Education provided regarding safety, falls, labs, imaging and plan of care. No questions from pt nor family at this time.     2200: Pt converted to A Fib, rate 50-65, asymptomatic, VS unchanged. Notified MD. EKG obtained, continue to monitor.     12-hour chart check complete.    Monitor Summary  Rhythm: SB-SR, A Fib  Rate: 45-68  Ectopy: rPAC, rPVC  Measurements: 0.25/0.08/0.41    "

## 2022-09-05 NOTE — ASSESSMENT & PLAN NOTE
If lower extremity duplex is negative we will proceed with VQ scan  Will not empirically anticoagulate at this time pending VQ scan results

## 2022-09-05 NOTE — ED NOTES
Med rec updated and complete. Allergies reviewed. Pt confirmed name and date of birth. Pt denies antibiotic use in last 30 days.      Pt is from [facility] and has no local pharmacy

## 2022-09-05 NOTE — CARE PLAN
The patient is   Problem: Knowledge Deficit - Standard  Goal: Patient and family/care givers will demonstrate understanding of plan of care, disease process/condition, diagnostic tests and medications  Outcome: Progressing            Progress made toward(s) clinical / shift goals:  Education    Patient is not progressing towards the following goals:

## 2022-09-05 NOTE — HOSPITAL COURSE
Mr. José Zabala Jr is a 57-year-old male with a PMHx of DMT2, HTN, chronic kidney disease with baseline serum creatinine at 1.7-1.8, CAD, MI, DVT s/p knee surgery in 2020, admitted on 9/4/2022 due to complaints of chest pain.    Patient is a resident at [facility] and is here in town attending [facility].  Patient states that he has had significant history of CAD and received an angiogram when he had an MI and was told that he had normal coronary arteries.  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  Due to this, patient gets a yearly stress test with the last one being this January 2022 which was negative per patient.  Patient also reports on the morning admission that his chest pain was associated with nausea and vomiting and had an episode of chest pain that is dull in nature lasting a few minutes.  Patient denies chest pain radiating and is associated with shortness of breath and headache.  Patient at the [facility] gave him aspirin and nitroglycerin with resolution of his symptoms and was subsequently transferred to [facility].      In ER, notable lab findings noted RBC at 4.16, hemoglobin 11.8, hematocrit 35.5, glucose 122, BUN 31, creatinine 2.00, GFR 38, calcium 8.1.  Initial troponin T at 16 with a follow-up at 15.  Patient's D-dimer is 4.02.  Follow-up ultrasound bilateral extremity noted no evidence of DVT.  Patient admitted into the observation unit for ACS versus PE work-up.    During this hospital stay, patient noted to be in atrial fibrillation which is new findings for him.  Patient denies being diagnosed with atrial fibrillation in the past.  An echocardiogram was obtained which noted moderate concentric left ventricular hypertrophy with normal left ventricular size and systolic function with LVEF approximately 65%.  The right ventricle is normal in size and systolic function that has mildly dilated left atrium, aortic valve sclerosis without  significant stenosis and normal pericardium without effusion.  A follow-up NM VQ scan due to elevated D-dimer was done which noted normal ventilation scan with no segmental perfusion defects.  Patient was initiated on Xarelto 20 mg nightly for management of atrial fibrillation.  Patient currently on a beta-blocker carvedilol for management of blood pressure.    Patient seen and examined prior to being discharged.  Patient denies any symptoms at this time.  Patient denies any chest pain, no shortness of breath, no palpitations since admission.  Discussed with patient and and VQ scan results along with echocardiogram which were both unremarkable.  Discussed with patient continuation of carvedilol with addition to Xarelto for management of new onset atrial fibrillation.  Patient has recommended follow-up with PCP for management of care.  Patient to obtain referral to follow-up with cardiology for management of atrial fibrillation.  Patient to resume all home medications.  All questions and concerns answered prior to being discharged.  Patient discharged home.

## 2022-09-05 NOTE — PROGRESS NOTES
Arrive to dc lounge via wheelchair. A/O, dc instructions reviewed w/ pt, verbalized understanding. Meds to bed delivered. Will take taxi/self pay to [address] [address].

## 2022-09-05 NOTE — ED NOTES
Pt admitted to hospital, vss, report to receiving RN, pt transported upstairs in wheelchair on monitor and oxygen with RN.

## 2022-09-05 NOTE — ASSESSMENT & PLAN NOTE
EKG with no acute ischemic changes  Initial troponin normal  Patient will be admitted and monitored on telemetry  We will trend his troponin  He reports that he had a negative stress test 8 months ago we will try to obtain records  Hypoxia and elevated D-dimer we will check VQ scan  Chest x-ray reviewed negative for acute pathology